# Patient Record
Sex: FEMALE | Race: WHITE | URBAN - METROPOLITAN AREA
[De-identification: names, ages, dates, MRNs, and addresses within clinical notes are randomized per-mention and may not be internally consistent; named-entity substitution may affect disease eponyms.]

---

## 2017-05-31 ENCOUNTER — OFFICE VISIT (OUTPATIENT)
Dept: SURGERY | Age: 32
End: 2017-05-31

## 2017-05-31 VITALS
TEMPERATURE: 98.8 F | HEART RATE: 82 BPM | HEIGHT: 68 IN | BODY MASS INDEX: 28.04 KG/M2 | OXYGEN SATURATION: 98 % | DIASTOLIC BLOOD PRESSURE: 100 MMHG | SYSTOLIC BLOOD PRESSURE: 130 MMHG | RESPIRATION RATE: 20 BRPM | WEIGHT: 185 LBS

## 2017-05-31 DIAGNOSIS — L02.415 ABSCESS OF LEG, RIGHT: Primary | ICD-10-CM

## 2017-05-31 RX ORDER — SULFAMETHOXAZOLE AND TRIMETHOPRIM 800; 160 MG/1; MG/1
1 TABLET ORAL 2 TIMES DAILY
COMMUNITY

## 2017-05-31 NOTE — PROCEDURES
Procedure Note    Date - 5/31/17    Preoperative Diagnosis - right lower leg abscess    Postoperative Diagnosis -  right lower leg abscess    Operation Performed - incision and drainage of  right lower leg abscess    Indication - Quirino Morfin is a 36 y. o.yr old female with  right lower leg abscess    Surgeon - Mary Ann Del Rio M.D. Description of operation - The patient was consented prior to starting the procedure. All risks and benefits were explained in full detail. A time out was performed to identify the patient location and procedure. I then prepped and draped the area in standard sterile fashion. I then injected lidocaine into the operative field to anesthetize the area. I then made a 1cm incision over the abscess and drained out purulent fluid. The wound was washed out with saline irrigation and packed with 1/2 in packing. A dry sterile dressing was applied. Dr Laura Montalvo performed the entire procedure and was present the entire time.       Anesthesia - 1% lidocaine    Findings -  right lower leg abscess    Blood loss - minimal    Complications - none    Specimens/cultures - cultures taken    Disposition - DC home with ABx

## 2017-05-31 NOTE — PATIENT INSTRUCTIONS

## 2017-05-31 NOTE — PROGRESS NOTES
[facility] History and Physical    History of Present Illness:      Blayne Vargas is a 36 y.o. female who has an abscess of the right lower leg. About 3 wks ago she hit her shin on the steps and then had some swelling. The area of swelling has increased in size and is now more painful and more red. She does no have any drainage from the leg currently. She has no fever or chills. She has a scrape with a scab on in the L leg from the fall. The pain is a 3 of 10. History reviewed. No pertinent past medical history. History reviewed. No pertinent surgical history. Current Outpatient Prescriptions:     trimethoprim-sulfamethoxazole (BACTRIM DS, SEPTRA DS) 160-800 mg per tablet, Take 1 Tab by mouth two (2) times a day., Disp: , Rfl:     Allergies   Allergen Reactions    Rondec [Brompheniramine-Pseudoephedrin] Other (comments)     hyper       Social History     Social History    Marital status: SINGLE     Spouse name: N/A    Number of children: N/A    Years of education: N/A     Occupational History    Not on file.      Social History Main Topics    Smoking status: Current Every Day Smoker     Packs/day: 0.50     Years: 1.00    Smokeless tobacco: Never Used    Alcohol use Yes    Drug use: No    Sexual activity: Not Currently     Partners: Male     Other Topics Concern    Not on file     Social History Narrative       Family History   Problem Relation Age of Onset    Heart Disease Mother     Elevated Lipids Father     Heart Disease Maternal Grandmother     Diabetes Paternal Grandmother        ROS   Constitutional: negative  Ears, Nose, Mouth, Throat, and Face: negative  Respiratory: negative  Cardiovascular: negative  Gastrointestinal: negative  Genitourinary:negative  Integument/Breast: right leg abscess  Hematologic/Lymphatic: negative  Behavioral/Psychiatric: negative  Allergic/Immunologic: negative      Physical Exam:     Visit Vitals    BP (!) 130/100    Pulse 82  Temp 98.8 °F (37.1 °C) (Oral)    Resp 20    Ht 5' 7.5\" (1.715 m)    Wt 185 lb (83.9 kg)    SpO2 98%    BMI 28.55 kg/m2       General - alert and oriented, no apparent distress  HEENT - no jaundice, no hearing imparement  Pulm - CTAB, no C/W/R  CV - RRR, no M/R/G  Abd - soft, ND, BS present, NTTP  Ext - pulses intact in UE and LE bilaterally, no edema  Skin - supple, no rashes, R lower leg shin leg with a 4-5 cm area of erythema, induration, no fluctuance, L lower leg and shin with scab  Psychiatric - normal affect, good mood    Labs  none    Imaging  none  I have reviewed and agree with all of the pertinent images    Assessment:     Breonna Del Rio is a 36 y.o. female with right lower leg abscess    Recommendations:     1. She had an I+D done in the office today and will have the packing removed at home in 2 days. She will continue her abx and follow up with me in 1 wk. Cultures of the wound were sent off. Sima Contreras MD    Ms. Daryn Camarena has a reminder for a \"due or due soon\" health maintenance. I have asked that she contact her primary care provider for follow-up on this health maintenance.

## 2017-05-31 NOTE — PROGRESS NOTES
1. Have you been to the ER, urgent care clinic since your last visit? Hospitalized since your last visit? Yes, [facility] 5/30/17 for right leg pain. 2. Have you seen or consulted any other health care providers outside of the [address] [place] [address] since your last visit? Include any pap smears or colon screening. Yes, [facility].      [id number]  conformation  right leg culture.

## 2017-06-02 ENCOUNTER — OFFICE VISIT (OUTPATIENT)
Dept: SURGERY | Age: 32
End: 2017-06-02

## 2017-06-02 ENCOUNTER — TELEPHONE (OUTPATIENT)
Dept: SURGERY | Age: 32
End: 2017-06-02

## 2017-06-02 VITALS
HEART RATE: 86 BPM | WEIGHT: 185 LBS | OXYGEN SATURATION: 98 % | SYSTOLIC BLOOD PRESSURE: 124 MMHG | DIASTOLIC BLOOD PRESSURE: 70 MMHG | RESPIRATION RATE: 18 BRPM | BODY MASS INDEX: 28.04 KG/M2 | TEMPERATURE: 98.7 F | HEIGHT: 68 IN

## 2017-06-02 DIAGNOSIS — L02.415 ABSCESS OF LEG, RIGHT: Primary | ICD-10-CM

## 2017-06-02 NOTE — PATIENT INSTRUCTIONS

## 2017-06-02 NOTE — TELEPHONE ENCOUNTER
Patient states she removed the packing this morning. Incision is hard, reddened and she is concerned, would like to be seen today.

## 2017-06-02 NOTE — PROGRESS NOTES
Subjective:      Jacky Bhandari is a 36 y.o. female presents for postop care 3 days following  I+D of right lower leg abscess. She was worried the wound was getting worse, she was still having a little redness and the area seemed harder around the wound. She does have less pain now, and can walk on the leg more normal.    Objective:     Visit Vitals    /70    Pulse 86    Temp 98.7 °F (37.1 °C)    Resp 18    Ht 5' 7.5\" (1.715 m)    Wt 185 lb (83.9 kg)    SpO2 98%    BMI 28.55 kg/m2       General:  alert, cooperative, no distress, appears stated age       Right lower extremity Wound with less erythema, still some mild induration, no purulence, less TTP     Assessment:     1. Jacky Bhandari is a 36 y.o. female who is s/p I+D of right lower leg abscess      Plan:     1. The wound is healing well and improving, the induration is fine and the erythema, pain and drainage is less. 2. Cover the wound at home with a band aid and keep it dry other than showering  3. Follow-up in 1 week(s)    Ms. Bridget Wilson has a reminder for a \"due or due soon\" health maintenance. I have asked that she contact her primary care provider for follow-up on this health maintenance.

## 2017-06-02 NOTE — PROGRESS NOTES
1. Have you been to the ER, urgent care clinic since your last visit? Hospitalized since your last visit?  no    2. Have you seen or consulted any other health care providers outside of the [address] [place] [address] since your last visit? Include any pap smears or colon screening.    no

## 2017-06-03 LAB — BACTERIA SPEC AEROBE CULT: NORMAL

## 2017-06-07 ENCOUNTER — OFFICE VISIT (OUTPATIENT)
Dept: SURGERY | Age: 32
End: 2017-06-07

## 2017-06-07 VITALS
RESPIRATION RATE: 16 BRPM | BODY MASS INDEX: 27.89 KG/M2 | SYSTOLIC BLOOD PRESSURE: 120 MMHG | DIASTOLIC BLOOD PRESSURE: 70 MMHG | TEMPERATURE: 99 F | HEIGHT: 68 IN | OXYGEN SATURATION: 98 % | HEART RATE: 87 BPM | WEIGHT: 184 LBS

## 2017-06-07 DIAGNOSIS — L02.415 ABSCESS OF LEG, RIGHT: Primary | ICD-10-CM

## 2017-06-07 NOTE — PROGRESS NOTES
1. Have you been to the ER, urgent care clinic since your last visit? Hospitalized since your last visit?  no    2. Have you seen or consulted any other health care providers outside of the [address] [place] [address] since your last visit? Include any pap smears or colon screening.    no

## 2017-06-19 NOTE — PATIENT INSTRUCTIONS

## 2017-06-19 NOTE — PROGRESS NOTES
Subjective:      Jane Kwan is a 36 y.o. female presents for postop care 8 days following  I+D of right leg abscess. The area is healing well and she is having minimal pain now. Objective:     Visit Vitals    /70    Pulse 87    Temp 99 °F (37.2 °C)    Resp 16    Ht 5' 7.5\" (1.715 m)    Wt 184 lb (83.5 kg)    SpO2 98%    BMI 28.39 kg/m2       General:  alert, cooperative, no distress, appears stated age       Right lower leg shin:   healing well, no drainage, no erythema, minimal swelling     Assessment:     1. Jane Kwan is a 36 y.o. female who is s/p I+D of right leg abscess      Plan:     1. Pt is to increase activities as tolerated. .  2. No more wound care needed, finish abx  3. Follow-up prn    Ms. Harsha Salomon has a reminder for a \"due or due soon\" health maintenance. I have asked that she contact her primary care provider for follow-up on this health maintenance.